# Patient Record
Sex: FEMALE | Race: WHITE | Employment: FULL TIME | URBAN - METROPOLITAN AREA
[De-identification: names, ages, dates, MRNs, and addresses within clinical notes are randomized per-mention and may not be internally consistent; named-entity substitution may affect disease eponyms.]

---

## 2017-01-03 ENCOUNTER — ANESTHESIA EVENT (OUTPATIENT)
Dept: SURGERY | Facility: CLINIC | Age: 52
End: 2017-01-03
Payer: COMMERCIAL

## 2017-01-03 ENCOUNTER — SURGERY (OUTPATIENT)
Age: 52
End: 2017-01-03

## 2017-01-03 ENCOUNTER — ANESTHESIA (OUTPATIENT)
Dept: SURGERY | Facility: CLINIC | Age: 52
End: 2017-01-03
Payer: COMMERCIAL

## 2017-01-03 ENCOUNTER — HOSPITAL ENCOUNTER (OUTPATIENT)
Dept: ULTRASOUND IMAGING | Facility: CLINIC | Age: 52
End: 2017-01-03
Attending: OBSTETRICS & GYNECOLOGY | Admitting: OBSTETRICS & GYNECOLOGY
Payer: COMMERCIAL

## 2017-01-03 DIAGNOSIS — R10.2 PELVIC PAIN: ICD-10-CM

## 2017-01-03 PROCEDURE — 25000125 ZZHC RX 250: Performed by: NURSE ANESTHETIST, CERTIFIED REGISTERED

## 2017-01-03 PROCEDURE — 76857 US EXAM PELVIC LIMITED: CPT | Mod: TC

## 2017-01-03 PROCEDURE — 25800025 ZZH RX 258: Performed by: NURSE ANESTHETIST, CERTIFIED REGISTERED

## 2017-01-03 RX ORDER — PROPOFOL 10 MG/ML
INJECTION, EMULSION INTRAVENOUS CONTINUOUS PRN
Status: DISCONTINUED | OUTPATIENT
Start: 2017-01-03 | End: 2017-01-03

## 2017-01-03 RX ORDER — LIDOCAINE HYDROCHLORIDE 20 MG/ML
INJECTION, SOLUTION INFILTRATION; PERINEURAL PRN
Status: DISCONTINUED | OUTPATIENT
Start: 2017-01-03 | End: 2017-01-03

## 2017-01-03 RX ORDER — PROPOFOL 10 MG/ML
INJECTION, EMULSION INTRAVENOUS PRN
Status: DISCONTINUED | OUTPATIENT
Start: 2017-01-03 | End: 2017-01-03

## 2017-01-03 RX ORDER — FENTANYL CITRATE 50 UG/ML
INJECTION, SOLUTION INTRAMUSCULAR; INTRAVENOUS PRN
Status: DISCONTINUED | OUTPATIENT
Start: 2017-01-03 | End: 2017-01-03

## 2017-01-03 RX ORDER — ONDANSETRON 2 MG/ML
INJECTION INTRAMUSCULAR; INTRAVENOUS PRN
Status: DISCONTINUED | OUTPATIENT
Start: 2017-01-03 | End: 2017-01-03

## 2017-01-03 RX ORDER — DEXAMETHASONE SODIUM PHOSPHATE 4 MG/ML
INJECTION, SOLUTION INTRA-ARTICULAR; INTRALESIONAL; INTRAMUSCULAR; INTRAVENOUS; SOFT TISSUE PRN
Status: DISCONTINUED | OUTPATIENT
Start: 2017-01-03 | End: 2017-01-03

## 2017-01-03 RX ADMIN — PROPOFOL 30 MG: 10 INJECTION, EMULSION INTRAVENOUS at 10:05

## 2017-01-03 RX ADMIN — PROPOFOL 100 MCG/KG/MIN: 10 INJECTION, EMULSION INTRAVENOUS at 10:05

## 2017-01-03 RX ADMIN — DEXAMETHASONE SODIUM PHOSPHATE 4 MG: 4 INJECTION, SOLUTION INTRAMUSCULAR; INTRAVENOUS at 10:05

## 2017-01-03 RX ADMIN — LIDOCAINE HYDROCHLORIDE 40 MG: 20 INJECTION, SOLUTION INFILTRATION; PERINEURAL at 10:05

## 2017-01-03 RX ADMIN — FENTANYL CITRATE 50 MCG: 50 INJECTION, SOLUTION INTRAMUSCULAR; INTRAVENOUS at 10:05

## 2017-01-03 RX ADMIN — SODIUM CHLORIDE, POTASSIUM CHLORIDE, SODIUM LACTATE AND CALCIUM CHLORIDE: 600; 310; 30; 20 INJECTION, SOLUTION INTRAVENOUS at 10:34

## 2017-01-03 RX ADMIN — FENTANYL CITRATE 50 MCG: 50 INJECTION, SOLUTION INTRAMUSCULAR; INTRAVENOUS at 10:32

## 2017-01-03 RX ADMIN — MIDAZOLAM HYDROCHLORIDE 2 MG: 1 INJECTION, SOLUTION INTRAMUSCULAR; INTRAVENOUS at 09:56

## 2017-01-03 RX ADMIN — ONDANSETRON 4 MG: 2 INJECTION INTRAMUSCULAR; INTRAVENOUS at 10:06

## 2017-01-03 RX ADMIN — METOPROLOL TARTRATE 2 MG: 5 INJECTION INTRAVENOUS at 10:32

## 2017-01-03 ASSESSMENT — LIFESTYLE VARIABLES: TOBACCO_USE: 1

## 2017-01-03 NOTE — ANESTHESIA POSTPROCEDURE EVALUATION
Patient: Sirena Fofana    COMBINED DILATION AND CURETTAGE, HYSTEROSCOPY DIAGNOSTIC (N/A Abdomen)  Additional InformationProcedure(s):  hysteroscopy dilation and curettage - Wound Class: II-Clean Contaminated    Diagnosis:endometrial cells on pap, cervical os stenosis  Diagnosis Additional Information: No value filed.    Anesthesia Type:  MAC    Note:  Anesthesia Post Evaluation    Patient location during evaluation: Phase 2  Patient participation: Able to fully participate in evaluation  Level of consciousness: awake and alert  Pain management: adequate  Airway patency: patent  Cardiovascular status: acceptable  Respiratory status: acceptable  Hydration status: acceptable  PONV: none             Last vitals:  Filed Vitals:    01/03/17 1115 01/03/17 1130 01/03/17 1134   BP: 145/92 136/68    Temp:      Resp:      SpO2: 96% 94% 95%       Electronically Signed By: STEPHEN Hui CRNA  January 3, 2017  1:19 PM

## 2017-01-03 NOTE — ANESTHESIA PREPROCEDURE EVALUATION
Anesthesia Evaluation     . Pt has had prior anesthetic. Type: General and MAC    No history of anesthetic complications     ROS/MED HX    ENT/Pulmonary:     (+)JHONATAN risk factors obese, tobacco use, Current use 0.5 packs/day  , . .    Neurologic:  - neg neurologic ROS     Cardiovascular: Comment: Remote history of pericardial effusion     (+) hypertension----. : . . . :. . No previous cardiac testing       METS/Exercise Tolerance:  4 - Raking leaves, gardening   Hematologic:  - neg hematologic  ROS       Musculoskeletal:  - neg musculoskeletal ROS       GI/Hepatic:     (+) GERD       Renal/Genitourinary:         Endo:     (+) type II DM Last HgA1c: 10.2 date: 08.05.2016 Using insulin - not using insulin pump Normal glucose range: 150-300 Diabetic complications: nephropathy, Obesity, .      Psychiatric:  - neg psychiatric ROS       Infectious Disease:  - neg infectious disease ROS       Malignancy:      - no malignancy   Other:    (+) H/O Chronic Pain,H/O chronic opiod use ,              Physical Exam  Normal systems: cardiovascular, pulmonary and dental    Airway   Mallampati: II  TM distance: >3 FB  Neck ROM: full    Dental   (+) partials  Comment: Partial uppers, pt left at home    Cardiovascular   Rhythm and rate: regular and normal      Pulmonary    breath sounds clear to auscultation                    Anesthesia Plan      History & Physical Review  History and physical reviewed and following examination; no interval change.    ASA Status:  3 .    NPO Status:  > 8 hours    Plan for MAC with Intravenous and Propofol induction. Maintenance will be TIVA.  Reason for MAC:  Deep or markedly invasive procedure (G8)  PONV prophylaxis:  Ondansetron (or other 5HT-3) and Dexamethasone or Solumedrol       Postoperative Care  Postoperative pain management:  IV analgesics and Oral pain medications.      Consents  Anesthetic plan, risks, benefits and alternatives discussed with:  Patient.  Use of blood products discussed:  Yes.   Use of blood products discussed with Patient.  Consented to blood products.  .                          .

## 2017-01-03 NOTE — ANESTHESIA CARE TRANSFER NOTE
Patient: Sirena Fofana    COMBINED DILATION AND CURETTAGE, HYSTEROSCOPY DIAGNOSTIC (N/A Abdomen)  Additional InformationProcedure(s):  hysteroscopy dilation and curettage - Wound Class: II-Clean Contaminated    Diagnosis: endometrial cells on pap, cervical os stenosis  Diagnosis Additional Information: No value filed.    Anesthesia Type:   MAC     Note:  Airway :Face Mask  Patient transferred to:Phase II        Vitals: (Last set prior to Anesthesia Care Transfer)              Electronically Signed By: STEPHEN Hui CRNA  January 3, 2017  10:47 AM

## 2017-01-05 ENCOUNTER — TELEPHONE (OUTPATIENT)
Dept: FAMILY MEDICINE | Facility: OTHER | Age: 52
End: 2017-01-05

## 2017-01-05 NOTE — TELEPHONE ENCOUNTER
Dr. Monson Called patient regarding some past results and patient has a few question regarding that now    Hui Ramirez  Reception/ Scheduling

## 2017-01-05 NOTE — TELEPHONE ENCOUNTER
Patient is wondering what the next step is.  I recommend she wait to see how she does after she has healed. If she has any spotting or cramping after the healing process is complete, she should report that.  Patient will follow up for her postoperative visit.

## 2017-02-02 ENCOUNTER — OFFICE VISIT (OUTPATIENT)
Dept: OBGYN | Facility: OTHER | Age: 52
End: 2017-02-02
Payer: COMMERCIAL

## 2017-02-02 VITALS
RESPIRATION RATE: 16 BRPM | DIASTOLIC BLOOD PRESSURE: 86 MMHG | HEIGHT: 62 IN | WEIGHT: 194.5 LBS | BODY MASS INDEX: 35.79 KG/M2 | HEART RATE: 82 BPM | SYSTOLIC BLOOD PRESSURE: 140 MMHG

## 2017-02-02 DIAGNOSIS — Z12.31 VISIT FOR SCREENING MAMMOGRAM: ICD-10-CM

## 2017-02-02 DIAGNOSIS — Z09 POSTOPERATIVE EXAMINATION: Primary | ICD-10-CM

## 2017-02-02 PROCEDURE — 99024 POSTOP FOLLOW-UP VISIT: CPT | Performed by: OBSTETRICS & GYNECOLOGY

## 2017-02-02 ASSESSMENT — PAIN SCALES - GENERAL: PAINLEVEL: NO PAIN (0)

## 2017-02-02 NOTE — NURSING NOTE
"Chief Complaint   Patient presents with     Surgical Followup     Hysteroscopy dilation & curettage       Initial /86 mmHg  Pulse 82  Resp 16  Ht 5' 1.5\" (1.562 m)  Wt 194 lb 8 oz (88.225 kg)  BMI 36.16 kg/m2  LMP 09/30/2007 Estimated body mass index is 36.16 kg/(m^2) as calculated from the following:    Height as of this encounter: 5' 1.5\" (1.562 m).    Weight as of this encounter: 194 lb 8 oz (88.225 kg).  BP completed using cuff size: regular    No obstetric history on file.    The following HM Due: Vaccinations: Flu      The following patient reported/Care Every where data was sent to:  P ABSTRACT QUALITY INITIATIVES [82099]      Pt declines to have Flu Shot.    Shy Hanks CMA               "

## 2017-02-02 NOTE — PROGRESS NOTES
"  Sirena Fofana presents today for her post operative check up.  She had a HYSTEROSCOPY dilation and curettage   for endometrial cells seen on Pap..  She is doing well.  No concerns at this time.  She is eating and drinking well.  No trouble voiding on her own.  No vaginal bleeding.  No pain at this time.       Patient Active Problem List    Diagnosis Date Noted     Primary osteoarthritis of right knee 12/13/2015     Priority: Medium     Muscle spasm 09/21/2015     Priority: Medium     Elevated blood pressure reading without diagnosis of hypertension 07/13/2015     Priority: Medium     Endometrial cells on cervical Pap smear inconsistent with last menstrual period 05/14/2015     Priority: Medium     2/27/15 pap NIL/neg HPV. Endometrial cells present.  Plan: Complete EMB now, but pap will be due in 3 years.  3/31/15 gyn consult/endo bx. Patient later cancelled visit.  5/22/15 rescheduled for gyn consult/endo bx. Patient later cancelled.  8/31/15 reminder phone call made.   11/4/16 office visit. Note added for provider to discuss endo cells. Endo cells not discussed.   11/30/16 LM  12/1/16 Pt advised and scheduled for 12/15/16  12/15/16 EMG with Dr. Holden       CARDIOVASCULAR SCREENING; LDL GOAL LESS THAN 130 05/09/2013     Priority: Medium       REVIEW OF SYSTEMS  See HPI, otherwise 10 point ROS neg    Physical Exam:  Filed Vitals:    02/02/17 1532   BP: 140/86   Pulse: 82   Resp: 16   Height: 5' 1.5\" (1.562 m)   Weight: 194 lb 8 oz (88.225 kg)       GENERAL APPEARANCE: well nourished, well hydrated, no acute distress      Pathology report and operative findings reviewed with the patient    51 year old with endometrial cells on pap, failed endometrial biopsy, cervical stenosis and history of endometrial ablation.  Pretreated with misoprostol, but still had difficulty getting through internal os.  Findings: Exam under anesthesia limited by body habitus.  US findings limited by body habitus.  Minimal uterine " decent.  Cervical stenosis encountered at internal os.  A small opening into the uterus demonstrated synechiae from prior ablation, otherwise normal appearing but view was limited.  I tried to obtained a sample using the alligator forceps through the operating channel of the scope.      A: Endocervical curettings:   - Benign squamous epithelium and benign endocervical glandular   epithelium.   - Negative for dysplasia or koilocytosis.     B: Endometrial curettings:   - Blood clot with benign squamous epithelium, focal benign endocervical   glandular epithelium, focal smooth muscle consistent with myometrium,   and focal minute crushed tissue fragments of undetermined origin.   - Insufficient endometrial tissue for evaluation.       Post-op Impression:     Post op exam without complications    Recommendations:    Return to normal activities    May resume sexual activity    Return to PCP for ongoing care    Counseled on follow up recommendations. Routine Pap due in Feb 2018, at which time she would like to see me for a physical.  She will have a mammo in Aug.      Plan to do an US if she has  post menopausal bleeding or endometrial cells on pap.      Patient response: voiced understanding of recommendations, asked appropriate questions, states will comply with recommendations

## 2017-02-17 ENCOUNTER — THERAPY VISIT (OUTPATIENT)
Dept: CHIROPRACTIC MEDICINE | Facility: CLINIC | Age: 52
End: 2017-02-17
Payer: COMMERCIAL

## 2017-02-17 DIAGNOSIS — M99.01 SEGMENTAL DYSFUNCTION OF CERVICAL REGION: Primary | ICD-10-CM

## 2017-02-17 DIAGNOSIS — M99.02 SEGMENTAL DYSFUNCTION OF THORACIC REGION: ICD-10-CM

## 2017-02-17 DIAGNOSIS — M54.9 MECHANICAL BACK PAIN: ICD-10-CM

## 2017-02-17 PROCEDURE — 98940 CHIROPRACT MANJ 1-2 REGIONS: CPT | Mod: AT | Performed by: CHIROPRACTOR

## 2017-02-17 PROCEDURE — 99203 OFFICE O/P NEW LOW 30 MIN: CPT | Mod: 25 | Performed by: CHIROPRACTOR

## 2017-02-17 NOTE — PROGRESS NOTES
Chiropractic Clinic Visit    PCP: Brigida López    Sirena Fofana is a 51 year old female who is seen  as a self referral presenting with upper back and neck pain that began last month when she had a bunch of life stressors happen all at once. The pain is mostly on the left side in her upper back and some pain lower between the scapula. The pain is rated 4/10, described as dull. She denies radiation. She gets occasional headaches. She sleeps well at night. The pain is just there, and is more relaxed in the mornings and gets tighter throughout the day. She doesn't take anything for pain. She is not using ice or heat, but occasionally will rub it against a corner in the wall. Patient went to a chiropractor many years ago that focused on her lower back.     Injury: None    Location of Pain: left upper back   Duration of Pain: 1 month(s)  Rating of Pain at worst: 8/10  Rating of Pain Currently: 4/10  Symptoms are better with: Nothing  Symptoms are worse with: working throughout the day         Health History  as reported by the patient:    How does the patient rate their own health:   Fair    Current or past medical history:   Diabetes (controlled with insulin) and Smoking (half pack day at work)    Medical allergies:  Berries, tomatoes  See drugs above.    Past Traumas/Surgeries:  January biopsy cervical  Appendectomy  Palate shortened  Thermoablation  Tubal    Family History:  Brother has back issues.  Heart, HTN, CHO, thyroid - all run in family.     Medications:  other:  Insulin    Occupation:  Student and Inventory  - both FT    Primary job tasks:   Computer work    Barriers as home/work:   She has been able to manage work and school with pain and tightness.          Review of Systems  Musculoskeletal: as above  Remainder of review of systems is negative including constitutional, CV, pulmonary, GI, Skin and Neurologic except as noted in HPI or medical history.    No past medical  history on file.  Past Surgical History   Procedure Laterality Date     [place] screening  11/7/2013     Procedure: WAVESCAN SCREENING;  BILATERAL WAVESCAN;  Surgeon: Alfredo Cobos MD;  Location:  [facility]     Lasik customvue bilateral  11/8/2013     Procedure: LASIK CUSTOMVUE BILATERAL;  BILATERAL CUSTOMVUE LASIK WITH INTRALASE ;  Surgeon: Alfredo Cobos MD;  Location:  [facility]     Appendectomy  2002     Ent surgery  2005     tonsils & adenoids     Gyn surgery  2007     vaginal mesh implant     Gyn surgery  2012     mesh implant removed     Gyn surgery  2007     thermal ablation     Gyn surgery  1990     tubal ligation     Head & neck surgery  2012     oral surgery- 6 teeth removed     Dilation and curettage, hysteroscopy diagnostic, combined N/A 1/3/2017     Procedure: COMBINED DILATION AND CURETTAGE, HYSTEROSCOPY DIAGNOSTIC;  Surgeon: Alesha Holden MD;  Location: [facility]       Objective  LMP 09/30/2007    GENERAL APPEARANCE: healthy, alert and no distress   GAIT: NORMAL  SKIN: no suspicious lesions or rashes  NEURO: Normal strength and tone, mentation intact and speech normal  PSYCH:  mentation appears normal and affect normal/bright    Sirena was asked to complete the Neck Disability Index, today in the office. NDI Disability score: 2%; pain severity scale: 4/10.    Cervical Spine Exam    Range of Motion:         WNL, pulling    Inspection:         No visible deformity        normal lordotic curvature maintained    Tender:        Bilateral upper traps      Muscle strength:       C5 (shoulder abduction) symmetric 5/5       C6 (elbow flexion) symmetric 5/5       C7 (elbow extension) symmetric 5/5       C8 (finger abduction, thumb flexion) symmetric 5/5    Reflexes:        C5 (biceps) symmetric normal       C6 (supinator) symmetric normal       C7 (triceps) symmetric normal    Sensation:       grossly intact througout bilateral upper extremities    Special Tests:       neg (-)  Spurling  Brian's- negative, Distraction - negative and Shoulder depression - Right negative and Left negative    Lymphatics:        no edema noted in the upper extremities       Segmental spinal dysfunction/restrictions found at:  C2 RR, LRR  C5 LR, RRR  T1 RR, LRR  T3 LR, RRR  T7 E, FR.        Muscle spasm found in:Traps      Radiology:  None warranted at this time, consider if no improvement with conservative management.    Assessment:    No diagnosis found.    RX ordered/plan of care: Mechanical neck and back pain, with associated myospasm and intersegmental dysfunction.  Anticipated outcomes: Patient is expected to respond favorably to conservative management.  Possible risks and side effects: Minimal soreness expected post-adjustment.     After discussing the risk and benefits of care, patient consented to treatment.    Patient's condition:  Patient had restrictions pre-manipulation    Treatment effectiveness:  Post manipulation there is better intersegmental movement and Patient claims to feel looser post manipulation    Plan:    Procedures:    Evaluation and Management:  40359 Moderate level exam 30 min    CMT:  93007 Chiropractic manipulative treatment 1-2 regions performed   Cervical: Diversified, C2, C5 , Supine  Thoracic: Diversified, T1, T3, T6, Prone    Modalities:  41457: MSTM:  To Traps  for 5 min    Therapeutic procedures:  None      Prognosis: Good      Treatment plan and goals:  Goals:  Reduce pain in upper back and between scapula from 4/10 to 2/10 in 4 visits.  Reduce the frequency and intensity of headaches.     Frequency of care  Duration of care is estimated to be 4 weeks, from the initial treatment.  It is estimated that the patient will need a total of 8 visits to resolve this episode.  For the initial therapeutic trial of care, the frequency is recommended at once per week.  A reevaluation would be clinically appropriate in 8 visits, to determine progress and further course of  care.    In-Office Treatment  Spinal Chiropractic Manipulative Therapy:  Trial of care - re-evaluate after 8 visits.       Recommendations:    Instructions:ice 20 minutes every other hour as needed and stretch as instructed at visit    Follow-up:  Return to care in 1 week.     Disclaimer: This note consists of symbols derived from keyboarding, dictation and/or voice recognition software. As a result, there may be errors in the script that have gone undetected. Please consider this when interpreting information found in this chart.

## 2017-02-17 NOTE — MR AVS SNAPSHOT
"              After Visit Summary   2/17/2017    Sirena Fofana    MRN: [id number]           Patient Information     Date Of Birth          1965        Visit Information        Provider Department      2/17/2017 3:00 PM Elza Mccarty DC [facility]        Today's Diagnoses     Segmental dysfunction of cervical region    -  1    Segmental dysfunction of thoracic region        Mechanical back pain           Follow-ups after your visit        Your next 10 appointments already scheduled     Feb 24, 2017  2:00 PM [facility] Chiropractor with Elza Mccarty DC   [facility] ([facility])    [address] [place] [id number] [phone]              Who to contact     If you have questions or need follow up information about today's clinic visit or your schedule please contact [facility] directly at [phone].  Normal or non-critical lab and imaging results will be communicated to you by MyChart, letter or phone within 4 business days after the clinic has received the results. If you do not hear from us within 7 days, please contact the clinic through MyChart or phone. If you have a critical or abnormal lab result, we will notify you by phone as soon as possible.  Submit refill requests through [employer]ze or call your pharmacy and they will forward the refill request to us. Please allow 3 business days for your refill to be completed.          Additional Information About Your Visit        MyChart Information     [employer]ze lets you send messages to your doctor, view your test results, renew your prescriptions, schedule appointments and more. To sign up, go to [email or web address] . Click on \"Log in\" on the left side of the screen, which will take you to the Welcome page. Then click on \"Sign up Now\" on the right side of the page.     You will be asked to enter the access code listed below, as well as some personal " information. Please follow the directions to create your username and password.     Your access code is: [email or web address]  Expires: 3/27/2017  4:20 PM     Your access code will  in 90 days. If you need help or a new code, please call your [facility] clinic or [phone].        Care EveryWhere ID     This is your Care EveryWhere ID. This could be used by other organizations to access your [facility] medical records  [id number]        Your Vitals Were     Last Period                   2007            Blood Pressure from Last 3 Encounters:   17 140/86   17 136/68   16 160/90    Weight from Last 3 Encounters:   17 88.2 kg (194 lb 8 oz)   16 86.2 kg (190 lb)   16 89.1 kg (196 lb 6.4 oz)              We Performed the Following     CHIROPRAC MANIP,SPINAL,1-2 REGIONS     OFFICE/OUTPT VISIT,ROMARIO JAMES III        Primary Care Provider Office Phone # Fax #    Brigida STEPHEN [facility] [phone]       [facility] [address]  [facility] [address]        Thank you!     Thank you for choosing [facility]  for your care. Our goal is always to provide you with excellent care. Hearing back from our patients is one way we can continue to improve our services. Please take a few minutes to complete the written survey that you may receive in the mail after your visit with us. Thank you!             Your Updated Medication List - Protect others around you: Learn how to safely use, store and throw away your medicines at www.disposemymeds.org.          This list is accurate as of: 17  3:51 PM.  Always use your most recent med list.                   Brand Name Dispense Instructions for use    ATORVASTATIN CALCIUM PO      Take 80 mg by mouth daily 1 tablet every other day       benzonatate 200 MG capsule    TESSALON    21 capsule    Take 1 capsule (200 mg) by mouth 3 times daily as needed for cough        guaiFENesin-codeine 100-10 MG/5ML Soln solution    ROBITUSSIN AC    120 mL    Take 5-10 mLs by mouth every 4 hours as needed for cough       LANTUS SC      Inject 38 Units Subcutaneous 47 units per day in PM.       liraglutide 18 MG/3ML soln    VICTOZA     Inject Subcutaneous daily       NovoLOG FLEXPEN 100 UNIT/ML injection   Generic drug:  insulin aspart      Inject 20 Units Subcutaneous daily       oxyCODONE-acetaminophen 5-325 MG per tablet    PERCOCET    12 tablet    Take 1-2 tablets by mouth every 4 hours as needed for pain (moderate to severe)

## 2017-04-21 ENCOUNTER — TELEPHONE (OUTPATIENT)
Dept: FAMILY MEDICINE | Facility: OTHER | Age: 52
End: 2017-04-21

## 2017-04-21 DIAGNOSIS — Z12.11 SPECIAL SCREENING FOR MALIGNANT NEOPLASMS, COLON: Primary | ICD-10-CM

## 2017-04-21 NOTE — TELEPHONE ENCOUNTER
Summary:    Patient is due/failing the following:   BP CHECK, FIT and LDL    Action needed:   Patient needs fasting lab only appointment, Patient needs nurse only appointment. and complete a FIT test    Type of outreach:    Phone, spoke to patient.  patient will complete a FIT test  FIT test ordered and mailed to patient   Questions for provider review:    Perez Dalton       Chart routed to Care Team .        Panel Management Review      Patient has the following on her problem list:     Hypertension   Last three blood pressure readings:  BP Readings from Last 3 Encounters:   02/02/17 140/86   01/03/17 136/68   12/27/16 160/90     Blood pressure: FAILED    HTN Guidelines:  Age 18-59 BP range:  Less than 140/90  Age 60-85 with Diabetes:  Less than 140/90  Age 60-85 without Diabetes:  less than 150/90      Composite cancer screening  Chart review shows that this patient is due/due soon for the following Fecal Colorectal (FIT)

## 2017-04-28 PROCEDURE — 82274 ASSAY TEST FOR BLOOD FECAL: CPT | Performed by: STUDENT IN AN ORGANIZED HEALTH CARE EDUCATION/TRAINING PROGRAM

## 2017-05-02 DIAGNOSIS — Z12.11 SPECIAL SCREENING FOR MALIGNANT NEOPLASMS, COLON: ICD-10-CM

## 2017-05-02 LAB — HEMOCCULT STL QL IA: NEGATIVE

## 2017-06-25 PROBLEM — Z79.4 UNCONTROLLED TYPE 2 DIABETES MELLITUS WITH HYPERGLYCEMIA, WITH LONG-TERM CURRENT USE OF INSULIN (H): Status: ACTIVE | Noted: 2017-06-25

## 2017-06-25 PROBLEM — E11.65 UNCONTROLLED TYPE 2 DIABETES MELLITUS WITH HYPERGLYCEMIA, WITH LONG-TERM CURRENT USE OF INSULIN (H): Status: ACTIVE | Noted: 2017-06-25

## 2017-07-11 ENCOUNTER — TELEPHONE (OUTPATIENT)
Dept: FAMILY MEDICINE | Facility: OTHER | Age: 52
End: 2017-07-11

## 2017-07-11 NOTE — TELEPHONE ENCOUNTER
Summary:    Patient is due/failing the following:   Microalbumin, TSH, A1C, FOLLOW UP and LDL    Action needed:   Patient needs office visit for diabetic follow up. and Patient needs fasting lab only appointment    Type of outreach:    Phone, spoke to patient.  [facility] patient states her A1C in June was 9.9.    Questions for provider review:    None                                                                                                                                    Krysta Dalton       Chart routed to Care Team .        Panel Management Review      Patient has the following on her problem list:     Diabetes    ASA:     Last A1C  Lab Results   Component Value Date    A1C 10.8 11/14/2016    A1C 10.1 11/14/2016    A1C 13.7 09/23/2016    A1C 10.2 08/05/2016    A1C 10.2 08/05/2016     A1C tested: FAILED    Last LDL:    Lab Results   Component Value Date    CHOL 158 05/04/2016     Lab Results   Component Value Date    HDL 38 05/04/2016     Lab Results   Component Value Date    LDL 95 05/04/2016     Lab Results   Component Value Date    TRIG 123 05/04/2016     No results found for: CHOLHDLRATIO  Lab Results   Component Value Date    NHDL 120 05/04/2016       Is the patient on a Statin? YES             Is the patient on Aspirin? NO    Medications     HMG CoA Reductase Inhibitors    ATORVASTATIN CALCIUM PO          Last three blood pressure readings:  BP Readings from Last 3 Encounters:   02/02/17 140/86   01/03/17 136/68   12/27/16 160/90            Tobacco History:     History   Smoking Status     Current Every Day Smoker     Packs/day: 0.50     Types: Cigarettes   Smokeless Tobacco     Never Used         Hypertension   Last three blood pressure readings:  BP Readings from Last 3 Encounters:   02/02/17 140/86   01/03/17 136/68   12/27/16 160/90     Blood pressure: FAILED    HTN Guidelines:  Age 18-59 BP range:  Less than 140/90  Age 60-85 with Diabetes:  Less  than 140/90  Age 60-85 without Diabetes:  less than 150/90      Composite cancer screening  Chart review shows that this patient is due/due soon for the following None

## 2017-08-07 ENCOUNTER — HOSPITAL ENCOUNTER (OUTPATIENT)
Dept: CT IMAGING | Facility: CLINIC | Age: 52
Discharge: HOME | End: 2017-08-07
Attending: PHYSICIAN ASSISTANT | Admitting: PHYSICIAN ASSISTANT
Payer: COMMERCIAL

## 2017-08-07 ENCOUNTER — RADIANT APPOINTMENT (OUTPATIENT)
Dept: GENERAL RADIOLOGY | Facility: OTHER | Age: 52
End: 2017-08-07
Attending: PHYSICIAN ASSISTANT
Payer: COMMERCIAL

## 2017-08-07 ENCOUNTER — HOSPITAL ENCOUNTER (EMERGENCY)
Facility: CLINIC | Age: 52
End: 2017-08-07
Attending: FAMILY MEDICINE
Payer: COMMERCIAL

## 2017-08-07 ENCOUNTER — OFFICE VISIT (OUTPATIENT)
Dept: FAMILY MEDICINE | Facility: OTHER | Age: 52
End: 2017-08-07
Payer: COMMERCIAL

## 2017-08-07 VITALS
HEART RATE: 99 BPM | TEMPERATURE: 100.4 F | HEIGHT: 61 IN | OXYGEN SATURATION: 95 % | DIASTOLIC BLOOD PRESSURE: 98 MMHG | SYSTOLIC BLOOD PRESSURE: 174 MMHG | WEIGHT: 204 LBS | BODY MASS INDEX: 38.51 KG/M2 | RESPIRATION RATE: 18 BRPM

## 2017-08-07 VITALS
BODY MASS INDEX: 37.74 KG/M2 | SYSTOLIC BLOOD PRESSURE: 160 MMHG | TEMPERATURE: 98.4 F | WEIGHT: 203 LBS | HEART RATE: 107 BPM | DIASTOLIC BLOOD PRESSURE: 90 MMHG | RESPIRATION RATE: 16 BRPM | OXYGEN SATURATION: 96 %

## 2017-08-07 DIAGNOSIS — Z79.4 UNCONTROLLED TYPE 2 DIABETES MELLITUS WITH HYPERGLYCEMIA, WITH LONG-TERM CURRENT USE OF INSULIN (H): ICD-10-CM

## 2017-08-07 DIAGNOSIS — R07.89 ATYPICAL CHEST PAIN: ICD-10-CM

## 2017-08-07 DIAGNOSIS — R60.9 EDEMA, UNSPECIFIED TYPE: ICD-10-CM

## 2017-08-07 DIAGNOSIS — R03.0 ELEVATED BLOOD PRESSURE READING WITHOUT DIAGNOSIS OF HYPERTENSION: ICD-10-CM

## 2017-08-07 DIAGNOSIS — E11.65 UNCONTROLLED TYPE 2 DIABETES MELLITUS WITH HYPERGLYCEMIA, WITH LONG-TERM CURRENT USE OF INSULIN (H): ICD-10-CM

## 2017-08-07 DIAGNOSIS — L30.9 DERMATITIS: Primary | ICD-10-CM

## 2017-08-07 LAB
CREAT BLD-MCNC: 0.7 MG/DL (ref 0.52–1.04)
GFR SERPL CREATININE-BSD FRML MDRD: 87 ML/MIN/1.7M2

## 2017-08-07 PROCEDURE — 25000125 ZZHC RX 250: Performed by: PHYSICIAN ASSISTANT

## 2017-08-07 PROCEDURE — 25000128 H RX IP 250 OP 636: Performed by: PHYSICIAN ASSISTANT

## 2017-08-07 PROCEDURE — 99215 OFFICE O/P EST HI 40 MIN: CPT | Performed by: PHYSICIAN ASSISTANT

## 2017-08-07 PROCEDURE — 82565 ASSAY OF CREATININE: CPT | Performed by: PHYSICIAN ASSISTANT

## 2017-08-07 PROCEDURE — 71260 CT THORAX DX C+: CPT

## 2017-08-07 PROCEDURE — 93000 ELECTROCARDIOGRAM COMPLETE: CPT | Performed by: PHYSICIAN ASSISTANT

## 2017-08-07 PROCEDURE — 71020 XR CHEST 2 VW: CPT

## 2017-08-07 RX ORDER — LISINOPRIL AND HYDROCHLOROTHIAZIDE 12.5; 2 MG/1; MG/1
1 TABLET ORAL DAILY
Qty: 90 TABLET | Refills: 1 | Status: SHIPPED | OUTPATIENT
Start: 2017-08-07 | End: 2018-01-29

## 2017-08-07 RX ORDER — IOPAMIDOL 755 MG/ML
500 INJECTION, SOLUTION INTRAVASCULAR ONCE
Status: COMPLETED | OUTPATIENT
Start: 2017-08-07 | End: 2017-08-07

## 2017-08-07 RX ADMIN — IOPAMIDOL 75 ML: 755 INJECTION, SOLUTION INTRAVENOUS at 18:40

## 2017-08-07 RX ADMIN — SODIUM CHLORIDE 70 ML: 9 INJECTION, SOLUTION INTRAVENOUS at 18:41

## 2017-08-07 NOTE — NURSING NOTE
"Chief Complaint   Patient presents with     Derm Problem       Initial BP (!) 160/92 (BP Location: Right arm, Patient Position: Chair, Cuff Size: Adult Regular)  Pulse 107  Temp 98.4  F (36.9  C) (Oral)  Resp 16  Wt 203 lb (92.1 kg)  LMP 09/30/2007  SpO2 96%  BMI 37.74 kg/m2 Estimated body mass index is 37.74 kg/(m^2) as calculated from the following:    Height as of 2/2/17: 5' 1.5\" (1.562 m).    Weight as of this encounter: 203 lb (92.1 kg).  Medication Reconciliation: complete  "

## 2017-08-07 NOTE — PROGRESS NOTES
"  SUBJECTIVE:                                                    Sirena Fofana is a 52 year old female who presents to clinic today for the following health issues:      HPI    Rash  Onset: Saturday    Description:   Location: back of neck  Character: round, raised, red  Itching (Pruritis): no     Progression of Symptoms:  same    Accompanying Signs & Symptoms:  Fever: no   Body aches or joint pain: Yes right hip (chronic, not related)   Sore throat symptoms: no   Recent cold symptoms: no     History:   Previous similar rash: no     Precipitating factors:   Exposure to similar rash: no   New exposures: None   Recent travel: YES- [place]  Alleviating factors:  Also having swelling in feet       - Yesterday morning, started after her flight        - BP high       - Not currently on medication, was in the past      - Right side flutter, on and off all the time, mostly when breathes out        - BG's 120-190      - Was in [place], lots to drink (ETOH) and eat, did walk around a lot       [facility]       - Baby aspirin per day       - Denies chest pain, states \"just a flutter that lasts several seconds every time I breathe out)     - Both parents with blood clots    - Novolog insulin 20-15-40     Lantus 50-80    - Recent increase in her Lantus, weight gain every time they increase her insulin   Therapies Tried and outcome: none      Problem list and histories reviewed & adjusted, as indicated.  Additional history: as documented    ROS:  Constitutional, HEENT, cardiovascular, pulmonary, gi and gu systems are negative, except as otherwise noted.      OBJECTIVE:   BP (!) 160/92 (BP Location: Right arm, Patient Position: Chair, Cuff Size: Adult Regular)  Pulse 107  Temp 98.4  F (36.9  C) (Oral)  Resp 16  Wt 203 lb (92.1 kg)  LMP 09/30/2007  SpO2 96%  BMI 37.74 kg/m2  Body mass index is 37.74 kg/(m^2).  GENERAL APPEARANCE: healthy, alert and no distress  EYES: Eyes grossly normal to inspection, " PERRLA, conjunctivae and sclerae without injection or discharge, EOM intact   NECK: No adenopathy in cervical, supraclavicular, or axillary regions, no asymmetry, masses, or scars, and thyroid normal to palpation, no JVD   RESP: Lungs clear to auscultation - no rales, rhonchi or wheezes   CV: Sinus tachycardia, normal S1 S2, no S3 or S4, no murmur, click or rub, barely 1+ left and almost 2+ right pitting peripheral edema; and peripheral pulses strong and symmetric bilaterally, no calf tenderness  MS: No musculoskeletal defects are noted and gait is age appropriate without ataxia   SKIN: Bilateral calves - no erythema or tenderness, Back of neck - erythematous spot with signs of excoriation and scaling, no other suspicious lesions or rashes, hydration status appears adeuqate with normal skin turgor   PSYCH: Alert and oriented x3; speech- coherent , normal rate and volume; able to articulate logical thoughts, able to abstract reason, no tangential thoughts, no hallucinations or delusions, mentation appears normal, Mood is euthymic. Affect is appropriate for this mood state and bright. Thought content is free of suicidal ideation, hallucinations, and delusions. Dress is adequate and upkept. Eye contact is good during conversation.       Diagnostic Test Results:  EKG: Possible old MI sinus tachy, no acute st elevations or depressions, normal axis and voltage, no other EKG available for comparison     CXR: Normal- no infiltrates, effusions, pneumothoraces, cardiomegaly or masses  awaiting formal interpretation from Radiologist at this time      ASSESSMENT/PLAN:       ICD-10-CM    1. Dermatitis L30.9    2. Edema, unspecified type R60.9 EKG 12-lead complete w/read - Clinics     CT Chest w Contrast     XR Chest 2 Views   3. Atypical chest pain R07.89 EKG 12-lead complete w/read - Clinics     CT Chest w Contrast     XR Chest 2 Views   4. Elevated blood pressure reading without diagnosis of hypertension R03.0  "lisinopril-hydrochlorothiazide (PRINZIDE/ZESTORETIC) 20-12.5 MG per tablet   5. Uncontrolled type 2 diabetes mellitus with hyperglycemia, with long-term current use of insulin (H) E11.65     Z79.4      1. Dermatitis on back of neck   - Either heat rash or dermatitis due to travel and new exposures   - Recommend OTC hydrocortisone 1%, light layer twice daily until gone, not more than 2 weeks   - Hand out on dermatitis given    2-4.   - Concerning set of symptoms due to patient's recent travel and uncontrolled diabetes   - No calf tenderness, not likely to be DVT, also not likely since bilateral  - Concern for heart failure at first, but no dyspnea, shortness of breath, normal lung exam, and CXR doesn't show fluid on the lungs or cardiomegaly   - However, since patient not on aspirin, elevated blood pressure with sinus tachycardia, with \"chest fluttering\" symptom, and bilateral edema, concern for PE (however oxygen sat is normal at 96% and patient denies dyspnea)   - Recommend Urgent CT Chest - PE protocol   - Patient to go to [facility] for this       If normal, will attribute all symptoms to poor diet (salt, ETOH) and increased weight causing new onset hypertension      Patient then to start Prinzide (Lisinopril-HCTZ), reviewed use and side effects      Nurse only BP recheck in 1 week      Recheck with PCP or writer in 3-4 weeks, will need BMP lab recheck   - Discussed warning signs that would warrant return to clinic and/or ED     Should also return to clinic sooner if edema doesn't improve with Prinzide   - No labs were drawn today due to clinic hours as results wouldn't be obtained until tomorrow     5. Diabetes   - Patient admits to dietary indiscretions, will work on getting this back on track   - [facility]  - Recommend she discuss with them adding in a SGLT2 inhibitor (failed on GLP-1 and weight gain with insulin)     The patient indicates understanding of these issues and agrees with " the plan.    Follow up: immediately to [facility] for CT hold and call due to concern for PE       Report on this patient was given to Dr. Zara Babcock, who is covering clinic call this evening.       A total of 50 minutes was spent with the patient today, with greater than 50% of the visit involving counseling and coordination of care.        Chyna Baez PA-C  [facility]

## 2017-08-07 NOTE — ED NOTES
She has bilateral leg swelling that she noticed yesterday around noon.  She travelled on a plane and had just gotten home yesterday morning.  She said she walked a lot and drank a lot of ETOH on her vacation.

## 2017-08-07 NOTE — MR AVS SNAPSHOT
"              After Visit Summary   8/7/2017    Sirena Fofana    MRN: [id number]           Patient Information     Date Of Birth          1965        Visit Information        Provider Department      8/7/2017 4:00 PM Chyna Baez PA-C [facility]        Today's Diagnoses     Dermatitis    -  1    Uncontrolled type 2 diabetes mellitus with hyperglycemia, with long-term current use of insulin (H)        Elevated blood pressure reading without diagnosis of hypertension        Edema, unspecified type        Atypical chest pain          Care Instructions    - OTC hydrocortisone 1%      Apply light layer twice daily     - For diabetes - Consider talking to your doctor about SGLT2-2 inhibitors (gliflozins) such as Invokana, Farxiga, or Jardiance     - CT scan tonight     - Start tomorrow, Prinzide (Lisinopril + HCTZ) 1 pill in morning      Blood pressure recheck (nurse only) 1 week      Follow up with PCP or me in 3-4 weeks       Skin Dermatitis (Rash) Adult Description  The word \"rash\" means an outbreak of red bumps on the body. The way people use this term, \"a rash\" can refer to many different skin conditions.   A rash is an abnormal change in skin color or texture. Rashes usually result from skin irritation, which can have many causes.  Symptoms  Rashes can have different appearances. A general description is raised, red, itchy, and sometimes scaly areas that may vary in size and shape.   Causes  There are many possible causes for a rash. They include:  viruses   chickenpox   measles (Rubeola)   Croatian measles   Fifth disease   roseola   bacterial infections like impetigo and scarlet fever   Lyme disease, Carlos Mountain spotted fever   heat rash (prickly heat)   contact with something you are allergic to, such as medicines like penicillin   soaps and detergents   scabies   ringworm   germs in hot tubs   swimmer s itch   What You Should Do At Home (Follow-up Care)   Keep your skin " moisturized with lotion. Apply it several times a day if needed.   You can try taking a cool bath or shower (not hot because it could make you itch more) or apply calamine lotion.   You may also try a commercial product, such as Aveeno  Colloidal Oatmeal bath.   Do not scrub your skin. Do not rub vigorously when drying. Pat your skin dry.   Use as little soap as possible. Use a gentle cleanser such as Basis  or Dove .   Apply a cool damp washcloth to skin areas that are itchy.   Avoid heat or rubbing, which can make you itch more.   Avoid strenuous exercise or activity. This often makes itching worse.   Over-the-counter hydrocortisone cream can be applied to small areas.   Over-the-counter antihistamines such as diphenhydramine taken by mouth may help decrease itching.   If the itching is severe or not responding to the above treatments, your provider may prescribe an oral steroid medicine (for example, prednisone).   Keep your fingernails cut short so that you do not scratch yourself in your sleep.   Wear loose cotton clothing or other natural fibers.   Don t put cosmetics (makeup) on a rash.   Please keep all medicines out of the reach of children.   What You Can Do To Stay Healthy   Try not to change soaps or detergents that you know you have no reaction to. If you do need to change soaps or detergents, check your skin for any reaction.   Always monitor yourself closely after starting any new medicine or new food.   Care Alerts  Call 911 if:   You start to have trouble breathing, trouble swallowing, or feel tightness in your throat or chest.   You have trouble breathing, lightheadedness, nausea, or a cold sweat.   Call your healthcare provider right away or return to the emergency department if:   Your rash is getting worse.   You feel weak, dizzy, or lightheaded.   Your skin is becoming redder or more painful.   You have open sores from scratching that have red streaks from the wound going toward your heart.  "  The area feels very warm when you touch it.   You start to have pus or other fluid coming from the area.   You have a fever higher than 101.5  F (38.6  C) orally.   You start to have chills, nausea, vomiting, or muscle aches.   You have a question about whether your rash needs to be treated.   You have any symptoms that worry you.             Follow-ups after your visit        Future tests that were ordered for you today     Open Future Orders        Priority Expected Expires Ordered    CT Chest w Contrast Routine  2018            Who to contact     If you have questions or need follow up information about today's clinic visit or your schedule please contact [facility] directly at [phone].  Normal or non-critical lab and imaging results will be communicated to you by [employer], letter or phone within 4 business days after the clinic has received the results. If you do not hear from us within 7 days, please contact the clinic through [employer] or phone. If you have a critical or abnormal lab result, we will notify you by phone as soon as possible.  Submit refill requests through [employer] or call your pharmacy and they will forward the refill request to us. Please allow 3 business days for your refill to be completed.          Additional Information About Your Visit        [employer] Information     [employer] lets you send messages to your doctor, view your test results, renew your prescriptions, schedule appointments and more. To sign up, go to [email or web address] . Click on \"Log in\" on the left side of the screen, which will take you to the Welcome page. Then click on \"Sign up Now\" on the right side of the page.     You will be asked to enter the access code listed below, as well as some personal information. Please follow the directions to create your username and password.     Your access code is: [email or web address]  Expires: 2017  4:13 PM     Your access code will  in 90 days. " If you need help or a new code, please call your [facility] clinic or [phone].        Care EveryWhere ID     This is your Care EveryWhere ID. This could be used by other organizations to access your [facility] medical records  [id number]        Your Vitals Were     Pulse Temperature Respirations Last Period Pulse Oximetry BMI (Body Mass Index)    107 98.4  F (36.9  C) (Oral) 16 09/30/2007 96% 37.74 kg/m2       Blood Pressure from Last 3 Encounters:   08/07/17 160/90   02/02/17 140/86   01/03/17 136/68    Weight from Last 3 Encounters:   08/07/17 203 lb (92.1 kg)   02/02/17 194 lb 8 oz (88.2 kg)   03/02/16 190 lb (86.2 kg)              We Performed the Following     EKG 12-lead complete w/read - Clinics          Today's Medication Changes          These changes are accurate as of: 8/7/17  4:34 PM.  If you have any questions, ask your nurse or doctor.               Start taking these medicines.        Dose/Directions    lisinopril-hydrochlorothiazide 20-12.5 MG per tablet   Commonly known as:  PRINZIDE/ZESTORETIC   Used for:  Elevated blood pressure reading without diagnosis of hypertension   Started by:  Chyna Baez PA-C        Dose:  1 tablet   Take 1 tablet by mouth daily   Quantity:  90 tablet   Refills:  1         Stop taking these medicines if you haven't already. Please contact your care team if you have questions.     liraglutide 18 MG/3ML soln   Commonly known as:  VICTOZA   Stopped by:  Chyna Baez PA-C                Where to get your medicines      These medications were sent to [facility] [address] [facility] - [place], [place] - [address]  [address], [facility] [address]     Phone:  [phone]     lisinopril-hydrochlorothiazide 20-12.5 MG per tablet                Primary Care Provider Office Phone # Fax #    STEPHEN Johnston [phone]       [facility] [address]  [facility] [address]        Equal Access to  Services     [facility]: Hadii aad ku hadjaxalisa Rolaali, waaxda luqadaha, qaybta kaalmada verito, doug washburn. So [facility] [place] [phone].    ATENCIÓN: Si habla español, tiene a melgar disposición servicios gratuitos de asistencia lingüística. Llame al [phone].    We comply with applicable federal civil rights laws and [place] laws. We do not discriminate on the basis of race, color, national origin, age, disability sex, sexual orientation or gender identity.            Thank you!     Thank you for choosing [facility]  for your care. Our goal is always to provide you with excellent care. Hearing back from our patients is one way we can continue to improve our services. Please take a few minutes to complete the written survey that you may receive in the mail after your visit with us. Thank you!             Your Updated Medication List - Protect others around you: Learn how to safely use, store and throw away your medicines at www.disposemymeds.org.          This list is accurate as of: 8/7/17  4:34 PM.  Always use your most recent med list.                   Brand Name Dispense Instructions for use Diagnosis    ATORVASTATIN CALCIUM PO      Take 80 mg by mouth daily 1 tablet every other day        LANTUS SC      Inject 38 Units Subcutaneous 47 units per day in PM.        lisinopril-hydrochlorothiazide 20-12.5 MG per tablet    PRINZIDE/ZESTORETIC    90 tablet    Take 1 tablet by mouth daily    Elevated blood pressure reading without diagnosis of hypertension       NovoLOG FLEXPEN 100 UNIT/ML injection   Generic drug:  insulin aspart      Inject 20 Units Subcutaneous daily

## 2017-08-07 NOTE — PATIENT INSTRUCTIONS
"- OTC hydrocortisone 1%      Apply light layer twice daily     - For diabetes - Consider talking to your doctor about SGLT2-2 inhibitors (gliflozins) such as Invokana, Farxiga, or Jardiance     - CT scan tonight     - Start tomorrow, Prinzide (Lisinopril + HCTZ) 1 pill in morning      Blood pressure recheck (nurse only) 1 week      Follow up with PCP or me in 3-4 weeks       Skin Dermatitis (Rash) Adult Description  The word \"rash\" means an outbreak of red bumps on the body. The way people use this term, \"a rash\" can refer to many different skin conditions.   A rash is an abnormal change in skin color or texture. Rashes usually result from skin irritation, which can have many causes.  Symptoms  Rashes can have different appearances. A general description is raised, red, itchy, and sometimes scaly areas that may vary in size and shape.   Causes  There are many possible causes for a rash. They include:  viruses   chickenpox   measles (Rubeola)   Angolan measles   Fifth disease   roseola   bacterial infections like impetigo and scarlet fever   Lyme disease, [place] Mountain spotted fever   heat rash (prickly heat)   contact with something you are allergic to, such as medicines like penicillin   soaps and detergents   scabies   ringworm   germs in hot tubs   swimmer s itch   What You Should Do At Home (Follow-up Care)   Keep your skin moisturized with lotion. Apply it several times a day if needed.   You can try taking a cool bath or shower (not hot because it could make you itch more) or apply calamine lotion.   You may also try a commercial product, such as Aveeno  Colloidal Oatmeal bath.   Do not scrub your skin. Do not rub vigorously when drying. Pat your skin dry.   Use as little soap as possible. Use a gentle cleanser such as Basis  or Dove .   Apply a cool damp washcloth to skin areas that are itchy.   Avoid heat or rubbing, which can make you itch more.   Avoid strenuous exercise or activity. This often makes " itching worse.   Over-the-counter hydrocortisone cream can be applied to small areas.   Over-the-counter antihistamines such as diphenhydramine taken by mouth may help decrease itching.   If the itching is severe or not responding to the above treatments, your provider may prescribe an oral steroid medicine (for example, prednisone).   Keep your fingernails cut short so that you do not scratch yourself in your sleep.   Wear loose cotton clothing or other natural fibers.   Don t put cosmetics (makeup) on a rash.   Please keep all medicines out of the reach of children.   What You Can Do To Stay Healthy   Try not to change soaps or detergents that you know you have no reaction to. If you do need to change soaps or detergents, check your skin for any reaction.   Always monitor yourself closely after starting any new medicine or new food.   Care Alerts  Call 911 if:   You start to have trouble breathing, trouble swallowing, or feel tightness in your throat or chest.   You have trouble breathing, lightheadedness, nausea, or a cold sweat.   Call your healthcare provider right away or return to the emergency department if:   Your rash is getting worse.   You feel weak, dizzy, or lightheaded.   Your skin is becoming redder or more painful.   You have open sores from scratching that have red streaks from the wound going toward your heart.   The area feels very warm when you touch it.   You start to have pus or other fluid coming from the area.   You have a fever higher than 101.5  F (38.6  C) orally.   You start to have chills, nausea, vomiting, or muscle aches.   You have a question about whether your rash needs to be treated.   You have any symptoms that worry you.

## 2017-08-10 NOTE — PROGRESS NOTES
Test results discussed with patient by covering office and will review nodules with patient at upcoming appointment.    Chyna Baez PA-C

## 2017-08-14 ENCOUNTER — ALLIED HEALTH/NURSE VISIT (OUTPATIENT)
Dept: FAMILY MEDICINE | Facility: OTHER | Age: 52
End: 2017-08-14
Payer: COMMERCIAL

## 2017-08-14 VITALS — SYSTOLIC BLOOD PRESSURE: 144 MMHG | HEART RATE: 88 BPM | DIASTOLIC BLOOD PRESSURE: 90 MMHG

## 2017-08-14 DIAGNOSIS — Z01.30 BP CHECK: Primary | ICD-10-CM

## 2017-08-14 PROCEDURE — 99207 ZZC NO CHARGE NURSE ONLY: CPT

## 2017-08-14 NOTE — NURSING NOTE
Sirena Fofana is a 52 year old female who comes in today for a Blood Pressure check because of ongoing blood pressure monitoring.    *Document pulse and BP  *Use new set of vitals button for multiple readings.  *Use extended vitals for orthostatic    Vitals as recorded, a large cuff was used.    Patient is taking medication as prescribed  Patient is tolerating medications well.  Patient is not monitoring Blood Pressure at home.  Average readings if yes are n/a    Current complaints: none    Disposition: follow-up as indicated by MD/AP

## 2017-08-14 NOTE — MR AVS SNAPSHOT
"              After Visit Summary   2017    Sirena Fofana    MRN: [id number]           Patient Information     Date Of Birth          1965        Visit Information        Provider Department      2017 4:15 PM PASCALE ALONSO TEAM B, [facility]        Today's Diagnoses     BP check    -  1       Follow-ups after your visit        Who to contact     If you have questions or need follow up information about today's clinic visit or your schedule please contact [facility] directly at [phone].  Normal or non-critical lab and imaging results will be communicated to you by MarketBriefhart, letter or phone within 4 business days after the clinic has received the results. If you do not hear from us within 7 days, please contact the clinic through MarketBriefhart or phone. If you have a critical or abnormal lab result, we will notify you by phone as soon as possible.  Submit refill requests through Squabbler or call your pharmacy and they will forward the refill request to us. Please allow 3 business days for your refill to be completed.          Additional Information About Your Visit        MyChart Information     Squabbler lets you send messages to your doctor, view your test results, renew your prescriptions, schedule appointments and more. To sign up, go to [email or web address] . Click on \"Log in\" on the left side of the screen, which will take you to the Welcome page. Then click on \"Sign up Now\" on the right side of the page.     You will be asked to enter the access code listed below, as well as some personal information. Please follow the directions to create your username and password.     Your access code is: [email or web address]  Expires: 2017  4:13 PM     Your access code will  in 90 days. If you need help or a new code, please call your [facility] or [phone].        Care EveryWhere ID     This is your Care EveryWhere ID. This could be used by other organizations to " access your [place] medical records  [id number]        Your Vitals Were     Pulse Last Period                88 09/30/2007           Blood Pressure from Last 3 Encounters:   08/14/17 144/90   08/07/17 160/90   08/07/17 (!) 174/98    Weight from Last 3 Encounters:   08/07/17 203 lb (92.1 kg)   08/07/17 204 lb (92.5 kg)   02/02/17 194 lb 8 oz (88.2 kg)              Today, you had the following     No orders found for display       Primary Care Provider Office Phone # Fax #    Brigida López, APRN [phone]       [address] [facility] [address]  [place] [address]        Equal Access to Services     DAYANA RILEY : Hadii aad ku hadasho Soanastasiaali, waaxda luqadaha, qaybta kaalmada adeegyada, waxay jacquiein shlomo slade . So [facility] [phone].    ATENCIÓN: Si habla español, tiene a melgar disposición servicios gratuitos de asistencia lingüística. [facility] [phone].    We comply with applicable federal civil rights laws and [place] laws. We do not discriminate on the basis of race, color, national origin, age, disability sex, sexual orientation or gender identity.            Thank you!     Thank you for choosing [facility]  for your care. Our goal is always to provide you with excellent care. Hearing back from our patients is one way we can continue to improve our services. Please take a few minutes to complete the written survey that you may receive in the mail after your visit with us. Thank you!             Your Updated Medication List - Protect others around you: Learn how to safely use, store and throw away your medicines at www.disposemymeds.org.          This list is accurate as of: 8/14/17 11:59 PM.  Always use your most recent med list.                   Brand Name Dispense Instructions for use Diagnosis    ATORVASTATIN CALCIUM PO      Take 80 mg by mouth daily 1 tablet every other day        LANTUS SC      Inject 38 Units Subcutaneous 47 units per day in PM.         lisinopril-hydrochlorothiazide 20-12.5 MG per tablet    PRINZIDE/ZESTORETIC    90 tablet    Take 1 tablet by mouth daily    Elevated blood pressure reading without diagnosis of hypertension       NovoLOG FLEXPEN 100 UNIT/ML injection   Generic drug:  insulin aspart      Inject 20 Units Subcutaneous daily

## 2018-01-29 ENCOUNTER — TELEPHONE (OUTPATIENT)
Dept: FAMILY MEDICINE | Facility: OTHER | Age: 53
End: 2018-01-29

## 2018-01-29 DIAGNOSIS — R03.0 ELEVATED BLOOD PRESSURE READING WITHOUT DIAGNOSIS OF HYPERTENSION: ICD-10-CM

## 2018-01-31 RX ORDER — LISINOPRIL AND HYDROCHLOROTHIAZIDE 12.5; 2 MG/1; MG/1
TABLET ORAL
Qty: 90 TABLET | Refills: 1 | Status: SHIPPED | OUTPATIENT
Start: 2018-01-31

## 2018-01-31 NOTE — TELEPHONE ENCOUNTER
Spoke to patient and relayed message, she states she does not use the [facility], she declined an appointment. Will route to CDL as ELIF

## 2018-01-31 NOTE — TELEPHONE ENCOUNTER
Refill given. Please have patient come in for BP recheck on float schedule.    Tam Baez PA-C  [facility]

## 2018-01-31 NOTE — TELEPHONE ENCOUNTER
Prinzide:  Routing refill request to provider for review/approval because:  Labs out of range:  BP consistently elevated.   BMP due now.     Jaylyn Mcintyre, RN, BSN

## 2018-04-28 ENCOUNTER — HEALTH MAINTENANCE LETTER (OUTPATIENT)
Age: 53
End: 2018-04-28

## 2018-05-12 ENCOUNTER — OFFICE VISIT (OUTPATIENT)
Dept: URGENT CARE | Facility: RETAIL CLINIC | Age: 53
End: 2018-05-12
Payer: COMMERCIAL

## 2018-05-12 VITALS — DIASTOLIC BLOOD PRESSURE: 94 MMHG | HEART RATE: 91 BPM | SYSTOLIC BLOOD PRESSURE: 142 MMHG | TEMPERATURE: 98.9 F

## 2018-05-12 DIAGNOSIS — J30.2 ACUTE SEASONAL ALLERGIC RHINITIS, UNSPECIFIED TRIGGER: Primary | ICD-10-CM

## 2018-05-12 PROCEDURE — 99213 OFFICE O/P EST LOW 20 MIN: CPT | Performed by: PHYSICIAN ASSISTANT

## 2018-05-12 RX ORDER — PREDNISONE 10 MG/1
10 TABLET ORAL DAILY
Qty: 7 TABLET | Refills: 0 | Status: SHIPPED | OUTPATIENT
Start: 2018-05-12 | End: 2018-05-19

## 2018-05-12 NOTE — PROGRESS NOTES
Chief Complaint   Patient presents with     Allergies     has been taking OTC allergy meds with no improvement     Eye Problem     itchy, puffy     Nasal Congestion     runny     Ent Problem     ears itching     Throat Problem     itchy     SUBJECTIVE:  Sirena Fofana is a 53 year old female here with concerns about allergies. Puffy itchy eyes, itchy throat, itchy ears and nasal congestion. Sneezing.  She states onset of symptoms was 2 weeks ago.    Course of illness is worsening.   Severity moderate  Predisposing factors include seasonal allergies.   Recent treatment has included: Antihistamine    Past Medical History:   Diagnosis Date     Diabetes (H)      Hypertension      Current Outpatient Prescriptions   Medication Sig Dispense Refill     ATORVASTATIN CALCIUM PO Take 80 mg by mouth daily 1 tablet every other day       insulin aspart (NOVOLOG FLEXPEN) 100 UNIT/ML soln Inject 20 Units Subcutaneous daily       Insulin Glargine (LANTUS SC) Inject 38 Units Subcutaneous 47 units per day in PM.        lisinopril-hydrochlorothiazide (PRINZIDE/ZESTORETIC) 20-12.5 MG per tablet TAKE 1 TABLET BY MOUTH DAILY 90 tablet 1     Omega-3 Fatty Acids (FISH OIL OMEGA-3 PO)        predniSONE (DELTASONE) 10 MG tablet Take 1 tablet (10 mg) by mouth daily for 7 days 7 tablet 0     SERTRALINE HCL PO Take by mouth daily       Social History   Substance Use Topics     Smoking status: Current Every Day Smoker     Packs/day: 0.50     Types: Cigarettes     Smokeless tobacco: Never Used     Alcohol use Yes      Comment: social     Allergies   Allergen Reactions     Aspirin Anaphylaxis and Swelling     Facial and mouth swelling     Novocain [Procaine Hcl] Swelling     Dental procedure, not effective. Re-dosed and 1 hour later had swelling to side of face.     Lortab [Hydrocodone-Acetaminophen] Other (See Comments) and Nausea and Vomiting     sweats     Penicillins      As a child.  Reaction unknown.     Strawberry      Tomato Itching and  Rash     Raw tomato.      ROS:  Review of systems negative except as stated above.    OBJECTIVE:  BP (!) 142/94 (BP Location: Left arm)  Pulse 91  Temp 98.9  F (37.2  C) (Oral)  LMP 09/30/2007  GENERAL APPEARANCE: healthy, alert and no distress  EYES: PERRL, conjunctiva clear  HENT: No pain with palpation over frontal and maxillary sinuses. Ear canals normal TMs with mild serous effusions bilaterally. Nasal turbinates edematous and boggy with a blue hue bilaterally. Posterior pharynx is not erythematous.  NECK: supple, nontender, no lymphadenopathy  RESP: lungs clear to auscultation - no rales, rhonchi or wheezes  CV: regular rates and rhythm, normal S1 S2, no murmur noted    ASSESSMENT:    ICD-10-CM    1. Acute seasonal allergic rhinitis, unspecified trigger J30.2 predniSONE (DELTASONE) 10 MG tablet     PLAN:   Discussed prednisone increasing blood sugar. Sirena will attempt other over the counter meds first. If prednisone is needed, Sirena will monitor and adjust her Lantus as needed.  Patient Instructions   Take an antihistamine such as Claritin (loratadine), Zyrtec (cetirizine) or Allegra (fexofenadine) daily for allergy symptoms.  Flonase (fluticasone) 2 sprays in each nostril daily until symptoms resolve, then continue 1 spray in each nostril for at least 5 more days.  Start over an over the counter antihistamine eye drop such as Zaditor or Alaway (ketotifen) every 8-12 hours as directed on box.  Start prednisone if symptoms worsen or do not improve with the above medications.  Please follow up with primary care provider if not improving, worsening or new symptoms or for any adverse reactions to medications.    Follow up with primary care provider with any problems, questions or concerns or if symptoms worsen or fail to improve. Patient agreed to plan and verbalized understanding.    Tierra Keen PA-C  [facility]

## 2018-05-12 NOTE — MR AVS SNAPSHOT
"              After Visit Summary   2018    Sirena Fofana    MRN: [id number]           Patient Information     Date Of Birth          1965        Visit Information        Provider Department      2018 9:00 AM Sahara Keen PA-C [facility]        Today's Diagnoses     Acute seasonal allergic rhinitis, unspecified trigger    -  1      Care Instructions    Take an antihistamine such as Claritin (loratadine), Zyrtec (cetirizine) or Allegra (fexofenadine) daily for allergy symptoms.  Flonase (fluticasone) 2 sprays in each nostril daily until symptoms resolve, then continue 1 spray in each nostril for at least 5 more days.  Start over an over the counter antihistamine eye drop such as Zaditor or Alaway (ketotifen) every 8-12 hours as directed on box.  Start prednisone if symptoms worsen or do not improve with the above medications.  Please follow up with primary care provider if not improving, worsening or new symptoms or for any adverse reactions to medications.          Follow-ups after your visit        Who to contact     You can reach your care team any time of the day by calling [phone].  Notification of test results:  If you have an abnormal lab result, we will notify you by phone as soon as possible.         Additional Information About Your Visit        My Digital Shieldhart Information     5 CUPS and some sugar lets you send messages to your doctor, view your test results, renew your prescriptions, schedule appointments and more. To sign up, go to [email or web address] and some sugar . Click on \"Log in\" on the left side of the screen, which will take you to the Welcome page. Then click on \"Sign up Now\" on the right side of the page.     You will be asked to enter the access code listed below, as well as some personal information. Please follow the directions to create your username and password.     Your access code is: [email or web address]  Expires: 8/10/2018  9:28 AM     Your access code will  in 90 days. " If you need help or a new code, please call your [place] clinic or [phone].        Care EveryWhere ID     This is your Care EveryWhere ID. This could be used by other organizations to access your [place] medical records  [id number]        Your Vitals Were     Pulse Temperature Last Period             91 98.9  F (37.2  C) (Oral) 09/30/2007          Blood Pressure from Last 3 Encounters:   05/12/18 (!) 142/94   08/14/17 144/90   08/07/17 160/90    Weight from Last 3 Encounters:   08/07/17 203 lb (92.1 kg)   08/07/17 204 lb (92.5 kg)   02/02/17 194 lb 8 oz (88.2 kg)              Today, you had the following     No orders found for display         Today's Medication Changes          These changes are accurate as of 5/12/18  9:28 AM.  If you have any questions, ask your nurse or doctor.               Start taking these medicines.        Dose/Directions    predniSONE 10 MG tablet   Commonly known as:  DELTASONE   Used for:  Acute seasonal allergic rhinitis, unspecified trigger        Dose:  10 mg   Take 1 tablet (10 mg) by mouth daily for 7 days   Quantity:  7 tablet   Refills:  0            Where to get your medicines      These medications were sent to [id number] - [place], [place] - [address] [address] [facility]  [address] [facility] [address]     Phone:  [phone]     predniSONE 10 MG tablet                Primary Care Provider Office Phone # Fax #    Brigida Mary López, APRN -[phone] [address] [address] [place] [address]        Equal Access to Services     [facility] HospitalSHANIKA AH: Hadii alex miramontes hadasho Soomaali, waaxda luqadaha, qaybta kaalmada adeegyeni, doug idiin hayaan adeeg kharash la'aan . So [facility] [phone].    ATENCIÓN: Si habla español, tiene a melgar disposición servicios gratuitos de asistencia lingüística. Llame al [phone].    We comply with applicable federal civil rights laws and [place] laws. We do not discriminate on the basis of race, color, national  origin, age, disability, sex, sexual orientation, or gender identity.            Thank you!     Thank you for choosing [facility]  for your care. Our goal is always to provide you with excellent care. Hearing back from our patients is one way we can continue to improve our services. Please take a few minutes to complete the written survey that you may receive in the mail after your visit with us. Thank you!             Your Updated Medication List - Protect others around you: Learn how to safely use, store and throw away your medicines at www.disposemymeds.org.          This list is accurate as of 5/12/18  9:28 AM.  Always use your most recent med list.                   Brand Name Dispense Instructions for use Diagnosis    ATORVASTATIN CALCIUM PO      Take 80 mg by mouth daily 1 tablet every other day        FISH OIL OMEGA-3 PO           LANTUS SC      Inject 38 Units Subcutaneous 47 units per day in PM.        lisinopril-hydrochlorothiazide 20-12.5 MG per tablet    PRINZIDE/ZESTORETIC    90 tablet    TAKE 1 TABLET BY MOUTH DAILY    Elevated blood pressure reading without diagnosis of hypertension       NovoLOG FLEXPEN 100 UNIT/ML injection   Generic drug:  insulin aspart      Inject 20 Units Subcutaneous daily        predniSONE 10 MG tablet    DELTASONE    7 tablet    Take 1 tablet (10 mg) by mouth daily for 7 days    Acute seasonal allergic rhinitis, unspecified trigger       SERTRALINE HCL PO      Take by mouth daily

## 2018-05-12 NOTE — PATIENT INSTRUCTIONS
Take an antihistamine such as Claritin (loratadine), Zyrtec (cetirizine) or Allegra (fexofenadine) daily for allergy symptoms.  Flonase (fluticasone) 2 sprays in each nostril daily until symptoms resolve, then continue 1 spray in each nostril for at least 5 more days.  Start over an over the counter antihistamine eye drop such as Zaditor or Alaway (ketotifen) every 8-12 hours as directed on box.  Start prednisone if symptoms worsen or do not improve with the above medications.  Please follow up with primary care provider if not improving, worsening or new symptoms or for any adverse reactions to medications.

## 2018-06-26 ENCOUNTER — TELEPHONE (OUTPATIENT)
Dept: FAMILY MEDICINE | Facility: OTHER | Age: 53
End: 2018-06-26

## 2018-06-26 DIAGNOSIS — Z12.11 SPECIAL SCREENING FOR MALIGNANT NEOPLASMS, COLON: Primary | ICD-10-CM

## 2018-06-26 NOTE — TELEPHONE ENCOUNTER
Summary:    Patient is due/failing the following:   FIT and PAP    Action needed:   Patient needs office visit for PAP. and complete a FIT test     Type of outreach:    Phone, spoke to patient.  patient will complete a FIT test.     Questions for provider review:    None                                                                                                                                    Krysta Datlon       Chart routed to Care Team .        Panel Management Review      Patient has the following on her problem list:     Diabetes    ASA:     Last A1C  Lab Results   Component Value Date    A1C 10.8 11/14/2016    A1C 10.1 11/14/2016    A1C 13.7 09/23/2016    A1C 10.2 08/05/2016    A1C 10.2 08/05/2016     A1C tested: FAILED    Last LDL:    Lab Results   Component Value Date    CHOL 158 05/04/2016     Lab Results   Component Value Date    HDL 38 05/04/2016     Lab Results   Component Value Date    LDL 95 05/04/2016     Lab Results   Component Value Date    TRIG 123 05/04/2016     No results found for: CHOLHDLRATIO  Lab Results   Component Value Date    NHDL 120 05/04/2016       Is the patient on a Statin? YES             Is the patient on Aspirin? NO    Medications     HMG CoA Reductase Inhibitors    ATORVASTATIN CALCIUM PO          Last three blood pressure readings:  BP Readings from Last 3 Encounters:   05/12/18 (!) 142/94   08/14/17 144/90   08/07/17 160/90      Tobacco History:     History   Smoking Status     Current Every Day Smoker     Packs/day: 0.50     Types: Cigarettes   Smokeless Tobacco     Never Used         Hypertension   Last three blood pressure readings:  BP Readings from Last 3 Encounters:   05/12/18 (!) 142/94   08/14/17 144/90   08/07/17 160/90     Blood pressure: FAILED    HTN Guidelines:  Age 18-59 BP range:  Less than 140/90  Age 60-85 with Diabetes:  Less than 140/90  Age 60-85 without Diabetes:  less than 150/90      Composite cancer screening  Chart review shows that this  patient is due/due soon for the following Pap Smear and Fecal Colorectal (FIT)

## 2018-07-05 DIAGNOSIS — Z12.11 SPECIAL SCREENING FOR MALIGNANT NEOPLASMS, COLON: ICD-10-CM

## 2018-07-05 PROCEDURE — 82274 ASSAY TEST FOR BLOOD FECAL: CPT | Performed by: STUDENT IN AN ORGANIZED HEALTH CARE EDUCATION/TRAINING PROGRAM

## 2018-07-08 LAB — HEMOCCULT STL QL IA: NEGATIVE

## 2018-10-02 ENCOUNTER — TELEPHONE (OUTPATIENT)
Dept: FAMILY MEDICINE | Facility: OTHER | Age: 53
End: 2018-10-02

## 2018-10-02 DIAGNOSIS — E11.65 UNCONTROLLED TYPE 2 DIABETES MELLITUS WITH HYPERGLYCEMIA, WITH LONG-TERM CURRENT USE OF INSULIN (H): Primary | ICD-10-CM

## 2018-10-02 DIAGNOSIS — Z79.4 UNCONTROLLED TYPE 2 DIABETES MELLITUS WITH HYPERGLYCEMIA, WITH LONG-TERM CURRENT USE OF INSULIN (H): Primary | ICD-10-CM

## 2018-10-02 NOTE — TELEPHONE ENCOUNTER
Please call patient     Not sure if seeing myself, KV, or EM. But patient is on my diabetes fail list. She is overdue for diabetes recheck as last appointment was over one year ago (8/7/17 with myself).     Will need fasting labs. Could also schedule as a physical to get PAP smear as well.     There is [facility] Please confirm this.     Tam Baez PA-C  [facility]

## 2018-10-03 NOTE — TELEPHONE ENCOUNTER
Patient notified. [facility]. Will update care team.  Anaya Diamond CMA

## 2019-02-08 ENCOUNTER — TELEPHONE (OUTPATIENT)
Dept: FAMILY MEDICINE | Facility: OTHER | Age: 54
End: 2019-02-08

## 2019-02-08 NOTE — TELEPHONE ENCOUNTER
Panel Management Review    Summary:    Patient is due/failing the following:   Creatinine, A1c, LDL, PHQ2, Hep C, Microalbumin, TSH, Physical, MAMMOGRAM and PAP    Action needed:   Patient needs office visit for Physical., Patient needs fasting lab only appointment and Patient needs referral/order: Mammogram & PAP    Type of outreach:    Phone, spoke to patient.  [facility]    Questions for provider review:    None                                                                                                                                    Shy Hanks CMA (AAMA)        Patient has the following on her problem list:     Diabetes    ASA: Failed    Last A1C  Lab Results   Component Value Date    A1C 10.8 11/14/2016    A1C 10.1 11/14/2016    A1C 13.7 09/23/2016    A1C 10.2 08/05/2016    A1C 10.2 08/05/2016     A1C tested: FAILED    Last LDL:    Lab Results   Component Value Date    CHOL 158 05/04/2016     Lab Results   Component Value Date    HDL 38 05/04/2016     Lab Results   Component Value Date    LDL 95 05/04/2016     Lab Results   Component Value Date    TRIG 123 05/04/2016     No results found for: CHOLHDLRATIO  Lab Results   Component Value Date    NHDL 120 05/04/2016       Is the patient on a Statin? YES             Is the patient on Aspirin? YES    Medications     HMG CoA Reductase Inhibitors    ATORVASTATIN CALCIUM PO          Last three blood pressure readings:  BP Readings from Last 3 Encounters:   05/12/18 (!) 142/94   08/14/17 144/90   08/07/17 (!) 174/98       Date of last diabetes office visit: 8/7/17     Tobacco History:     History   Smoking Status     Current Every Day Smoker     Packs/day: 0.50     Types: Cigarettes   Smokeless Tobacco     Never Used         Hypertension   Last three blood pressure readings:  BP Readings from Last 3 Encounters:   05/12/18 (!) 142/94   08/14/17 144/90   08/07/17 (!) 174/98     Blood pressure: FAILED    HTN Guidelines:  Age 18-59  BP range:  Less than 140/90  Age 60-85 with Diabetes:  Less than 140/90  Age 60-85 without Diabetes:  less than 150/90      Composite cancer screening  Chart review shows that this patient is due/due soon for the following Pap Smear and Mammogram

## 2020-02-07 ENCOUNTER — TELEPHONE (OUTPATIENT)
Dept: FAMILY MEDICINE | Facility: CLINIC | Age: 55
End: 2020-02-07

## 2020-02-07 NOTE — TELEPHONE ENCOUNTER
Reason for call:  Form  Reason for Call:  Form, our goal is to have forms completed with 72 hours, however, some forms may require a visit or additional information.    Type of letter, form or note:  medical    Who is the form from?:  [facility] paperwork for Sirena to care for her Mother - who is a patient of Paris Wilson    Where did the form come from: form was brought in    What clinic location was the form placed at?: [facility] - [phone]    Where the form was placed: 's Box    What number is listed as a contact on the form?:          Additional comments:  Fax to  [phone]    created by Amanda Sow

## 2020-07-28 ENCOUNTER — VIRTUAL VISIT (OUTPATIENT)
Dept: FAMILY MEDICINE | Facility: OTHER | Age: 55
End: 2020-07-28

## 2020-07-28 DIAGNOSIS — Z20.822 SUSPECTED COVID-19 VIRUS INFECTION: Primary | ICD-10-CM

## 2020-07-28 PROCEDURE — U0003 INFECTIOUS AGENT DETECTION BY NUCLEIC ACID (DNA OR RNA); SEVERE ACUTE RESPIRATORY SYNDROME CORONAVIRUS 2 (SARS-COV-2) (CORONAVIRUS DISEASE [COVID-19]), AMPLIFIED PROBE TECHNIQUE, MAKING USE OF HIGH THROUGHPUT TECHNOLOGIES AS DESCRIBED BY CMS-2020-01-R: HCPCS | Performed by: FAMILY MEDICINE

## 2020-07-28 NOTE — PROGRESS NOTES
"Date: 2020 07:48:45  Clinician: Earl Riley  Clinician NPI: [id number]  Patient: Sirena Fofana  Patient : 1965  Patient Address: [address] [place] [address]  Patient Phone: [phone]  Visit Protocol: URI  Patient Summary:  Sirena is a 55 year old ( : 1965 ) female who initiated a Visit for COVID-19 (Coronavirus) evaluation and screening. When asked the question \"Please sign me up to receive news, health information and promotions. \", Sirena responded \"No\".    Sirena states her symptoms started gradually 3-4 days ago. After her symptoms started, they improved and then got worse again.   Her symptoms consist of myalgia, facial pain or pressure, malaise, a headache, and chills. Sirena also feels feverish but was unable to measure her temperature.   Symptom details     Facial pain or pressure: The facial pain or pressure feels worse when bending over or leaning forward.     Headache: She states the headache is mild (1-3 on a 10 point pain scale).      Sirena denies having wheezing, nausea, teeth pain, ageusia, diarrhea, sore throat, anosmia, cough, nasal congestion, vomiting, rhinitis, ear pain, and enlarged lymph nodes. She also denies having recent facial or sinus surgery in the past 60 days, taking antibiotic medication in the past month, and having a sinus infection within the past year. She is not experiencing dyspnea.   Precipitating events  She has not recently been exposed to someone with influenza. Sirena has been in close contact with the following high risk individuals: adults 65 or older.   Pertinent COVID-19 (Coronavirus) information  In the past 14 days, Sirena has not worked in a congregate living setting.   She does not work or volunteer as healthcare worker or a  and does not work or volunteer in a healthcare facility.   Sirena also has not lived in a congregate living setting in the past 14 days. She does not live with a healthcare worker.   Sirena has not had " a close contact with a laboratory-confirmed COVID-19 patient within 14 days of symptom onset.   Pertinent medical history  Sirena typically gets a yeast infection when she takes antibiotics. She has used fluconazole (Diflucan) to treat previous yeast infections. 2 doses of fluconazole (Diflucan) has typically been needed for symptoms to resolve in the past.  Sirena needs a return to work/school note.   Weight: 178 lbs   Sirena does not smoke or use smokeless tobacco.   Additional information as reported by the patient (free text): Additional Medication:  Lisinopril, Omeprazole, Duloxetine, ozempic; Additional allergies; Lowertab; Diabetic since 2003; Using Lantus 55 units at evening, ozempic 1 unit once a week   Weight: 178 lbs    MEDICATIONS: metformin oral-blood sugar diagnostic, Lantus U-100 Insulin subcutaneous, gabapentin oral, ALLERGIES: Penicillins, aspirin  Clinician Response:  Dear Sirena,   Your symptoms show that you may have coronavirus (COVID-19). This illness can cause fever, cough and trouble breathing. Many people get a mild case and get better on their own. Some people can get very sick.  What should I do?  We would like to test you for this virus.   1. Please call [phone] to schedule your visit. Explain that you were referred by [facility] to have a COVID-19 test. Be ready to share your [facility] visit ID number.  The following will serve as your written order for this COVID Test, ordered by me, for the indication of suspected COVID [Z20.828]: The test will be ordered in [employer], our electronic health record, after you are scheduled. It will show as ordered and authorized by Jaden Bravo MD.  Order: COVID-19 (Coronavirus) PCR for SYMPTOMATIC testing from [facility].      2. When it's time for your COVID test:  Stay at least 6 feet away from others. (If someone will drive you to your test, stay in the backseat, as far away from the  as you can.)   Cover your mouth and nose with a mask, tissue or washcloth.   "Go straight to the testing site. Don't make any stops on the way there or back.      3.Starting now: Stay home and away from others (self-isolate) until:   You've had no fever---and no medicine that reduces fever---for 3 full days (72 hours). And...   Your other symptoms have gotten better. For example, your cough or breathing has improved. And...   At least 10 days have passed since your symptoms started.       During this time, don't leave the house except for testing or medical care.   Stay in your own room, even for meals. Use your own bathroom if you can.   Stay away from others in your home. No hugging, kissing or shaking hands. No visitors.  Don't go to work, school or anywhere else.    Clean \"high touch\" surfaces often (doorknobs, counters, handles, etc.). Use a household cleaning spray or wipes. You'll find a full list of  on the EPA website: www.epa.gov/pesticide-registration/list-n-disinfectants-use-against-sars-cov-2.   Cover your mouth and nose with a mask, tissue or washcloth to avoid spreading germs.  Wash your hands and face often. Use soap and water.  Caregivers in these groups are at risk for severe illness due to COVID-19:  o People 65 years and older  o People who live in a nursing home or long-term care facility  o People with chronic disease (lung, heart, cancer, diabetes, kidney, liver, immunologic)  o People who have a weakened immune system, including those who:   Are in cancer treatment  Take medicine that weakens the immune system, such as corticosteroids  Had a bone marrow or organ transplant  Have an immune deficiency  Have poorly controlled HIV or AIDS  Are obese (body mass index of 40 or higher)  Smoke regularly   o Caregivers should wear gloves while washing dishes, handling laundry and cleaning bedrooms and bathrooms.  o Use caution when washing and drying laundry: Don't shake dirty laundry, and use the warmest water setting that you can.  o For more tips, go to " www.cdc.gov/coronavirus/2019-ncov/downloads/10Things.pdf.    4.Sign up for [employer]. We know it's scary to hear that you might have COVID-19. We want to track your symptoms to make sure you're okay over the next 2 weeks. Please look for an email from [employer]---this is a free, online program that we'll use to keep in touch. To sign up, follow the link in the email. Learn more at [email or web address]  How can I take care of myself?   Get lots of rest. Drink extra fluids (unless a doctor has told you not to).   Take Tylenol (acetaminophen) for fever or pain. If you have liver or kidney problems, ask your family doctor if it's okay to take Tylenol.   Adults can take either:    650 mg (two 325 mg pills) every 4 to 6 hours, or...   1,000 mg (two 500 mg pills) every 8 hours as needed.    Note: Don't take more than 3,000 mg in one day. Acetaminophen is found in many medicines (both prescribed and over-the-counter medicines). Read all labels to be sure you don't take too much.   For children, check the Tylenol bottle for the right dose. The dose is based on the child's age or weight.    If you have other health problems (like cancer, heart failure, an organ transplant or severe kidney disease): Call your specialty clinic if you don't feel better in the next 2 days.       Know when to call 911. Emergency warning signs include:    Trouble breathing or shortness of breath Pain or pressure in the chest that doesn't go away Feeling confused like you haven't felt before, or not being able to wake up Bluish-colored lips or face.  Where can I get more information?    [employer] [facility] -- About COVID-19: [email or web address]   CDC -- What to Do If You're Sick: www.cdc.gov/coronavirus/2019-ncov/about/steps-when-sick.html   CDC -- Ending Home Isolation: www.cdc.gov/coronavirus/2019-ncov/hcp/disposition-in-home-patients.html   CDC -- Caring for Someone:  www.cdc.gov/coronavirus/2019-ncov/if-you-are-sick/care-for-someone.html   [facility] -- Interim Guidance for Hospital Discharge to Home: www.health.[facility].[place]/diseases/coronavirus/hcp/hospdischarge.pdf   [facility] clinical trials (COVID-19 research studies): clinicalaffairs.[facility]-clinical-trials    Below are the COVID-19 hotlines at the [place] Department of Health ([facility]). Interpreters are available.    For health questions: Call 406-860-1183 or 1-520.573.8902 (7 a.m. to 7 p.m.) For questions about schools and childcare: Call 845-948-1219 or 1-290.907.6474 (7 a.m. to 7 p.m.)    Diagnosis: Cough  Diagnosis ICD: R05

## 2020-07-29 LAB
SARS-COV-2 RNA SPEC QL NAA+PROBE: NOT DETECTED
SPECIMEN SOURCE: NORMAL